# Patient Record
Sex: FEMALE | URBAN - METROPOLITAN AREA
[De-identification: names, ages, dates, MRNs, and addresses within clinical notes are randomized per-mention and may not be internally consistent; named-entity substitution may affect disease eponyms.]

---

## 2021-02-02 ENCOUNTER — OFFICE VISIT (OUTPATIENT)
Dept: URBAN - METROPOLITAN AREA CLINIC 81 | Facility: CLINIC | Age: 81
End: 2021-02-02
Payer: MEDICARE

## 2021-02-02 DIAGNOSIS — Z96.1 PRESENCE OF INTRAOCULAR LENS: Chronic | ICD-10-CM

## 2021-02-02 PROCEDURE — 92004 COMPRE OPH EXAM NEW PT 1/>: CPT | Performed by: OPTOMETRIST

## 2021-02-02 ASSESSMENT — INTRAOCULAR PRESSURE
OS: 15
OD: 15

## 2022-02-02 ENCOUNTER — OFFICE VISIT (OUTPATIENT)
Dept: URBAN - METROPOLITAN AREA CLINIC 81 | Facility: CLINIC | Age: 82
End: 2022-02-02
Payer: MEDICARE

## 2022-02-02 DIAGNOSIS — H43.813 VITREOUS DEGENERATION, BILATERAL: Primary | ICD-10-CM

## 2022-02-02 DIAGNOSIS — H04.123 DRY EYE SYNDROME OF BILATERAL LACRIMAL GLANDS: ICD-10-CM

## 2022-02-02 PROCEDURE — 92014 COMPRE OPH EXAM EST PT 1/>: CPT | Performed by: OPTOMETRIST

## 2022-02-02 ASSESSMENT — INTRAOCULAR PRESSURE
OD: 16
OS: 16

## 2022-02-02 ASSESSMENT — KERATOMETRY
OD: 44.63
OS: 44.75

## 2022-02-02 NOTE — IMPRESSION/PLAN
Impression: Vitreous degeneration, bilateral: H43.813.

-stable OU Plan: Discussed signs and symptoms of PVD/floaters. Signs and symptoms of retinal detachment were discussed in detail. There is no evidence of retina pathology. No treatment is required at this time. Patient instructed to call immediately if any signs or symptoms of retinal detachments occur.

## 2022-02-02 NOTE — IMPRESSION/PLAN
Impression: Dry eye syndrome of bilateral lacrimal glands: H04.123. Plan: Discussed diagnosis in detail with patient. Continue Systane Complete or Refresh Relieva OU BID-QID longterm. Monitor for changes.